# Patient Record
Sex: MALE | Race: WHITE | NOT HISPANIC OR LATINO | Employment: UNEMPLOYED | URBAN - NONMETROPOLITAN AREA
[De-identification: names, ages, dates, MRNs, and addresses within clinical notes are randomized per-mention and may not be internally consistent; named-entity substitution may affect disease eponyms.]

---

## 2018-05-02 ENCOUNTER — OFFICE VISIT CONVERTED (OUTPATIENT)
Dept: OTOLARYNGOLOGY | Facility: CLINIC | Age: 68
End: 2018-05-02
Attending: OTOLARYNGOLOGY

## 2018-05-30 ENCOUNTER — OFFICE VISIT CONVERTED (OUTPATIENT)
Dept: OTOLARYNGOLOGY | Facility: CLINIC | Age: 68
End: 2018-05-30
Attending: OTOLARYNGOLOGY

## 2021-05-16 VITALS — TEMPERATURE: 97.3 F | HEIGHT: 70 IN | WEIGHT: 164 LBS | BODY MASS INDEX: 23.48 KG/M2

## 2021-05-16 VITALS — TEMPERATURE: 97.5 F | WEIGHT: 164.12 LBS | BODY MASS INDEX: 23.49 KG/M2 | HEIGHT: 70 IN

## 2021-05-22 ENCOUNTER — TRANSCRIBE ORDERS (OUTPATIENT)
Dept: CARDIAC REHAB | Facility: HOSPITAL | Age: 71
End: 2021-05-22

## 2021-05-22 DIAGNOSIS — I25.2 OLD MI (MYOCARDIAL INFARCTION): Primary | ICD-10-CM

## 2021-06-04 ENCOUNTER — HOSPITAL ENCOUNTER (OUTPATIENT)
Dept: CARDIAC REHAB | Facility: HOSPITAL | Age: 71
Setting detail: RECURRING SERIES
Discharge: HOME | End: 2021-06-05
Attending: INTERNAL MEDICINE

## 2021-06-07 ENCOUNTER — TREATMENT (OUTPATIENT)
Dept: CARDIAC REHAB | Facility: HOSPITAL | Age: 71
End: 2021-06-07

## 2021-06-07 DIAGNOSIS — I21.29 ST ELEVATION MYOCARDIAL INFARCTION (STEMI) INVOLVING OTHER CORONARY ARTERY (HCC): Primary | ICD-10-CM

## 2021-06-07 PROCEDURE — 93798 PHYS/QHP OP CAR RHAB W/ECG: CPT

## 2021-06-09 ENCOUNTER — TREATMENT (OUTPATIENT)
Dept: CARDIAC REHAB | Facility: HOSPITAL | Age: 71
End: 2021-06-09

## 2021-06-09 DIAGNOSIS — I25.2 MI, OLD: Primary | ICD-10-CM

## 2021-06-09 PROCEDURE — 93798 PHYS/QHP OP CAR RHAB W/ECG: CPT

## 2021-06-09 RX ORDER — METOPROLOL SUCCINATE 50 MG/1
TABLET, EXTENDED RELEASE ORAL
COMMUNITY

## 2021-06-09 RX ORDER — LISINOPRIL 20 MG/1
TABLET ORAL
COMMUNITY
Start: 2021-03-19

## 2021-06-09 RX ORDER — ASPIRIN 81 MG/1
TABLET ORAL
COMMUNITY
Start: 2021-03-19

## 2021-06-09 RX ORDER — LEVOTHYROXINE SODIUM 88 UG/1
88 TABLET ORAL DAILY
COMMUNITY
Start: 2021-03-18

## 2021-06-09 RX ORDER — ATORVASTATIN CALCIUM 80 MG/1
TABLET, FILM COATED ORAL
COMMUNITY
Start: 2021-05-20

## 2021-06-09 RX ORDER — LORATADINE 10 MG/1
10 TABLET ORAL DAILY
COMMUNITY
Start: 2021-03-18

## 2021-06-09 NOTE — PROGRESS NOTES
Patient tolerated phase 2 cardiac rehab exercise session without difficulty.  See exercise session report for details.

## 2021-06-11 ENCOUNTER — TREATMENT (OUTPATIENT)
Dept: CARDIAC REHAB | Facility: HOSPITAL | Age: 71
End: 2021-06-11

## 2021-06-11 DIAGNOSIS — I25.2 OLD MI (MYOCARDIAL INFARCTION): ICD-10-CM

## 2021-06-11 PROCEDURE — 93798 PHYS/QHP OP CAR RHAB W/ECG: CPT

## 2021-06-14 ENCOUNTER — TREATMENT (OUTPATIENT)
Dept: CARDIAC REHAB | Facility: HOSPITAL | Age: 71
End: 2021-06-14

## 2021-06-14 DIAGNOSIS — I25.2 OLD MI (MYOCARDIAL INFARCTION): Primary | ICD-10-CM

## 2021-06-14 PROCEDURE — 93798 PHYS/QHP OP CAR RHAB W/ECG: CPT

## 2021-06-16 ENCOUNTER — TREATMENT (OUTPATIENT)
Dept: CARDIAC REHAB | Facility: HOSPITAL | Age: 71
End: 2021-06-16

## 2021-06-16 DIAGNOSIS — I25.2 OLD MI (MYOCARDIAL INFARCTION): Primary | ICD-10-CM

## 2021-06-16 PROCEDURE — 93798 PHYS/QHP OP CAR RHAB W/ECG: CPT

## 2021-06-18 ENCOUNTER — TREATMENT (OUTPATIENT)
Dept: CARDIAC REHAB | Facility: HOSPITAL | Age: 71
End: 2021-06-18

## 2021-06-18 DIAGNOSIS — I25.2 OLD MI (MYOCARDIAL INFARCTION): Primary | ICD-10-CM

## 2021-06-18 PROCEDURE — 93798 PHYS/QHP OP CAR RHAB W/ECG: CPT

## 2021-06-21 ENCOUNTER — TREATMENT (OUTPATIENT)
Dept: CARDIAC REHAB | Facility: HOSPITAL | Age: 71
End: 2021-06-21

## 2021-06-21 DIAGNOSIS — I25.2 OLD MI (MYOCARDIAL INFARCTION): Primary | ICD-10-CM

## 2021-06-21 PROCEDURE — 93798 PHYS/QHP OP CAR RHAB W/ECG: CPT

## 2021-06-21 NOTE — PROGRESS NOTES
Patient tolerated  cardiac rehab exercise session without difficulty.  See exercise session report for details.

## 2021-06-23 ENCOUNTER — TREATMENT (OUTPATIENT)
Dept: CARDIAC REHAB | Facility: HOSPITAL | Age: 71
End: 2021-06-23

## 2021-06-23 DIAGNOSIS — I25.2 OLD MI (MYOCARDIAL INFARCTION): Primary | ICD-10-CM

## 2021-06-23 PROCEDURE — 93798 PHYS/QHP OP CAR RHAB W/ECG: CPT

## 2021-06-25 ENCOUNTER — TREATMENT (OUTPATIENT)
Dept: CARDIAC REHAB | Facility: HOSPITAL | Age: 71
End: 2021-06-25

## 2021-06-25 DIAGNOSIS — I25.2 OLD MI (MYOCARDIAL INFARCTION): Primary | ICD-10-CM

## 2021-06-25 PROCEDURE — 93798 PHYS/QHP OP CAR RHAB W/ECG: CPT

## 2021-06-28 ENCOUNTER — TREATMENT (OUTPATIENT)
Dept: CARDIAC REHAB | Facility: HOSPITAL | Age: 71
End: 2021-06-28

## 2021-06-28 DIAGNOSIS — I25.2 OLD MI (MYOCARDIAL INFARCTION): Primary | ICD-10-CM

## 2021-06-28 PROCEDURE — 93798 PHYS/QHP OP CAR RHAB W/ECG: CPT

## 2021-07-02 ENCOUNTER — APPOINTMENT (OUTPATIENT)
Dept: CARDIAC REHAB | Facility: HOSPITAL | Age: 71
End: 2021-07-02

## 2021-07-12 ENCOUNTER — TREATMENT (OUTPATIENT)
Dept: CARDIAC REHAB | Facility: HOSPITAL | Age: 71
End: 2021-07-12

## 2021-07-12 DIAGNOSIS — I25.2 OLD MI (MYOCARDIAL INFARCTION): Primary | ICD-10-CM

## 2021-07-12 PROCEDURE — 93798 PHYS/QHP OP CAR RHAB W/ECG: CPT

## 2021-07-14 ENCOUNTER — TREATMENT (OUTPATIENT)
Dept: CARDIAC REHAB | Facility: HOSPITAL | Age: 71
End: 2021-07-14

## 2021-07-14 DIAGNOSIS — I25.2 OLD MI (MYOCARDIAL INFARCTION): Primary | ICD-10-CM

## 2021-07-14 PROCEDURE — 93798 PHYS/QHP OP CAR RHAB W/ECG: CPT

## 2021-07-16 ENCOUNTER — TREATMENT (OUTPATIENT)
Dept: CARDIAC REHAB | Facility: HOSPITAL | Age: 71
End: 2021-07-16

## 2021-07-16 DIAGNOSIS — I25.2 MI, OLD: Primary | ICD-10-CM

## 2021-07-16 PROCEDURE — 93798 PHYS/QHP OP CAR RHAB W/ECG: CPT

## 2021-07-19 ENCOUNTER — TREATMENT (OUTPATIENT)
Dept: CARDIAC REHAB | Facility: HOSPITAL | Age: 71
End: 2021-07-19

## 2021-07-19 DIAGNOSIS — I25.2 MI, OLD: Primary | ICD-10-CM

## 2021-07-19 PROCEDURE — 93798 PHYS/QHP OP CAR RHAB W/ECG: CPT

## 2021-07-21 ENCOUNTER — TREATMENT (OUTPATIENT)
Dept: CARDIAC REHAB | Facility: HOSPITAL | Age: 71
End: 2021-07-21

## 2021-07-21 DIAGNOSIS — I25.2 MI, OLD: Primary | ICD-10-CM

## 2021-07-21 PROCEDURE — 93798 PHYS/QHP OP CAR RHAB W/ECG: CPT

## 2021-07-23 ENCOUNTER — TREATMENT (OUTPATIENT)
Dept: CARDIAC REHAB | Facility: HOSPITAL | Age: 71
End: 2021-07-23

## 2021-07-23 DIAGNOSIS — I25.2 OLD MI (MYOCARDIAL INFARCTION): Primary | ICD-10-CM

## 2021-07-23 PROCEDURE — 93798 PHYS/QHP OP CAR RHAB W/ECG: CPT
